# Patient Record
Sex: MALE | Race: WHITE | URBAN - METROPOLITAN AREA
[De-identification: names, ages, dates, MRNs, and addresses within clinical notes are randomized per-mention and may not be internally consistent; named-entity substitution may affect disease eponyms.]

---

## 2022-08-11 ENCOUNTER — TELEPHONE ENCOUNTER (OUTPATIENT)
Dept: URBAN - METROPOLITAN AREA CLINIC 92 | Facility: CLINIC | Age: 37
End: 2022-08-11

## 2022-08-11 ENCOUNTER — OFFICE VISIT (OUTPATIENT)
Dept: URBAN - METROPOLITAN AREA TELEHEALTH 2 | Facility: TELEHEALTH | Age: 37
End: 2022-08-11
Payer: COMMERCIAL

## 2022-08-11 VITALS — WEIGHT: 200 LBS | HEIGHT: 68 IN | BODY MASS INDEX: 30.31 KG/M2

## 2022-08-11 DIAGNOSIS — R10.84 GENERALIZED ABDOMINAL PAIN: ICD-10-CM

## 2022-08-11 DIAGNOSIS — R19.7 DIARRHEA, UNSPECIFIED TYPE: ICD-10-CM

## 2022-08-11 PROCEDURE — 99204 OFFICE O/P NEW MOD 45 MIN: CPT | Performed by: INTERNAL MEDICINE

## 2022-08-11 RX ORDER — LISINOPRIL 10 MG/1
1 TABLET TABLET ORAL ONCE A DAY
Status: ACTIVE | COMMUNITY

## 2022-08-11 RX ORDER — NORTRIPTYLINE HYDROCHLORIDE 25 MG/1
1 CAPSULE CAPSULE ORAL ONCE A DAY
Status: ACTIVE | COMMUNITY

## 2022-08-11 RX ORDER — CHOLESTYRAMINE 4 G/9G
1 PACKET MIXED WITH WATER OR NON-CARBONATED DRINK POWDER, FOR SUSPENSION ORAL TWICE A DAY
Qty: 60 | Refills: 2 | OUTPATIENT
Start: 2022-08-11

## 2022-08-11 RX ORDER — ESCITALOPRAM OXALATE 5 MG/1
1 TABLET TABLET, FILM COATED ORAL ONCE A DAY
Status: ACTIVE | COMMUNITY

## 2022-08-11 RX ORDER — VANCOMYCIN HYDROCHLORIDE 125 MG/1
1 CAPSULE CAPSULE ORAL ONCE A DAY
Status: ACTIVE | COMMUNITY

## 2022-08-11 NOTE — HPI-TODAY'S VISIT:
The patient is a new patient who elicits having had abdominal pain and diarrhea with no blood or mucous. Over 1 month ago, he traveled to [place] and developed abdominal pain and diarrhea. Immodium helped some. He saw his PCP about this and he was started on vancomycin which has helped him. His C diff test was negative. He was COVID negative with an at home test. Before he left to [place] he did an 11 day cleanse with meal replacement shakes. He also swam in a pool and in [facility] ocean in [place] and had seafood like octopus and lobster while he was there. He drank bottled water but brushed his teeth with the hotel water and drank drinks with ice. Last colonoscopy: never. He denies a family history of colon polyps or colon cancer or IBD. His abdominal pain has ceased now but he is having 2-3 loose BMs a day.

## 2022-08-22 LAB
A/G RATIO: 1.5
ALBUMIN: 4.6
ALKALINE PHOSPHATASE: 76
ALT (SGPT): 37
AST (SGOT): 22
BASO (ABSOLUTE): 0
BASOS: 0
BILIRUBIN, TOTAL: 0.4
BUN/CREATININE RATIO: 13
BUN: 14
C DIFFICILE TOXINS A+B, EIA: NEGATIVE
CALCIUM: 9.6
CAMPYLOBACTER CULTURE: (no result)
CARBON DIOXIDE, TOTAL: 24
CHLORIDE: 100
CREATININE: 1.04
DEAMIDATED GLIADIN ABS, IGA: 4
DEAMIDATED GLIADIN ABS, IGG: 4
E COLI SHIGA TOXIN EIA: NEGATIVE
EGFR: 94
ENDOMYSIAL ANTIBODY IGA: NEGATIVE
EOS (ABSOLUTE): 0.1
EOS: 2
FATS, NEUTRAL: NORMAL
FATS, TOTAL: NORMAL
GIARDIA LAMBLIA AG, EIA: NEGATIVE
GLOBULIN, TOTAL: 3
GLUCOSE: 83
HEMATOCRIT: 47.1
HEMATOLOGY COMMENTS:: (no result)
HEMOGLOBIN: 15.6
HEP A AB, IGM: NEGATIVE
HEP A AB, TOTAL: POSITIVE
IMMATURE CELLS: (no result)
IMMATURE GRANS (ABS): 0
IMMATURE GRANULOCYTES: 0
IMMUNOGLOBULIN A, QN, SERUM: 611
LIPASE: 33
LYMPHS (ABSOLUTE): 1.6
LYMPHS: 30
Lab: (no result)
MCH: 27.9
MCHC: 33.1
MCV: 84
MONOCYTES(ABSOLUTE): 0.5
MONOCYTES: 10
NEUTROPHILS (ABSOLUTE): 3
NEUTROPHILS: 58
NRBC: (no result)
OVA + PARASITE EXAM: (no result)
PANCREATIC ELASTASE, FECAL: 481
PLATELETS: 275
POTASSIUM: 4.7
PROTEIN, TOTAL: 7.6
RBC: 5.6
RDW: 12.6
SALMONELLA/SHIGELLA SCREEN: (no result)
SODIUM: 136
T-TRANSGLUTAMINASE (TTG) IGA: <2
T-TRANSGLUTAMINASE (TTG) IGG: <2
TSH: 1.95
WBC: 5.2
WHITE BLOOD CELLS (WBC), STOOL: (no result)

## 2022-10-03 ENCOUNTER — WEB ENCOUNTER (OUTPATIENT)
Dept: URBAN - METROPOLITAN AREA CLINIC 128 | Facility: CLINIC | Age: 37
End: 2022-10-03

## 2022-10-03 ENCOUNTER — OFFICE VISIT (OUTPATIENT)
Dept: URBAN - METROPOLITAN AREA CLINIC 128 | Facility: CLINIC | Age: 37
End: 2022-10-03
Payer: COMMERCIAL

## 2022-10-03 VITALS
BODY MASS INDEX: 31.37 KG/M2 | TEMPERATURE: 97.8 F | HEIGHT: 68 IN | HEART RATE: 100 BPM | SYSTOLIC BLOOD PRESSURE: 144 MMHG | DIASTOLIC BLOOD PRESSURE: 98 MMHG | WEIGHT: 207 LBS

## 2022-10-03 DIAGNOSIS — R19.7 DIARRHEA, UNSPECIFIED TYPE: ICD-10-CM

## 2022-10-03 DIAGNOSIS — R76.8 HEPATITIS A ANTIBODY POSITIVE: ICD-10-CM

## 2022-10-03 DIAGNOSIS — R10.84 GENERALIZED ABDOMINAL PAIN: ICD-10-CM

## 2022-10-03 PROCEDURE — 99213 OFFICE O/P EST LOW 20 MIN: CPT | Performed by: PHYSICIAN ASSISTANT

## 2022-10-03 RX ORDER — VANCOMYCIN HYDROCHLORIDE 125 MG/1
1 CAPSULE CAPSULE ORAL ONCE A DAY
Status: ON HOLD | COMMUNITY

## 2022-10-03 RX ORDER — ESCITALOPRAM OXALATE 5 MG/1
1 TABLET TABLET, FILM COATED ORAL ONCE A DAY
Status: ACTIVE | COMMUNITY

## 2022-10-03 RX ORDER — CHOLESTYRAMINE 4 G/9G
1 PACKET MIXED WITH WATER OR NON-CARBONATED DRINK POWDER, FOR SUSPENSION ORAL TWICE A DAY
Qty: 60 | Refills: 2 | Status: ON HOLD | COMMUNITY
Start: 2022-08-11

## 2022-10-03 RX ORDER — NORTRIPTYLINE HYDROCHLORIDE 25 MG/1
1 CAPSULE CAPSULE ORAL ONCE A DAY
Status: ACTIVE | COMMUNITY

## 2022-10-03 RX ORDER — LISINOPRIL 10 MG/1
1 TABLET TABLET ORAL ONCE A DAY
Status: ACTIVE | COMMUNITY

## 2022-10-03 NOTE — HPI-TODAY'S VISIT:
The patient is here to follow up on his abdominal pain and diarrhea which have both ceased since his last office visit. He denies rectal bkeeding or mucous. He took the questran but has since stopped it. Over 1 month ago, he traveled to [place] and developed abdominal pain and diarrhea. Immodium helped some. He saw his PCP about this and he was started on vancomycin which has helped him. His C diff test was negative. He was COVID negative with an at home test. Before he left to [place] he did an 11 day cleanse with meal replacement shakes. He also swam in a pool and in [facility] ocean in [place] and had seafood like octopus and lobster while he was there. He drank bottled water but brushed his teeth with the hotel water and drank drinks with ice. Last colonoscopy: never. He denies a family history of colon polyps or colon cancer or IBD. His Hepatitis A total antibody was positibe buthis Hep A IgM level was normal. He has never received the hepatitis A or B vaccines. The rest of his bloodwork and stool studies were normal.

## 2023-11-09 ENCOUNTER — OFFICE VISIT (OUTPATIENT)
Dept: URBAN - METROPOLITAN AREA CLINIC 19 | Facility: CLINIC | Age: 38
End: 2023-11-09
Payer: COMMERCIAL

## 2023-11-09 ENCOUNTER — LAB OUTSIDE AN ENCOUNTER (OUTPATIENT)
Dept: URBAN - METROPOLITAN AREA CLINIC 19 | Facility: CLINIC | Age: 38
End: 2023-11-09

## 2023-11-09 VITALS
DIASTOLIC BLOOD PRESSURE: 80 MMHG | HEIGHT: 68 IN | OXYGEN SATURATION: 98 % | TEMPERATURE: 97.6 F | WEIGHT: 211.2 LBS | SYSTOLIC BLOOD PRESSURE: 132 MMHG | HEART RATE: 93 BPM | BODY MASS INDEX: 32.01 KG/M2

## 2023-11-09 DIAGNOSIS — Z83.719 FAMILY HISTORY OF COLONIC POLYPS: ICD-10-CM

## 2023-11-09 DIAGNOSIS — K62.5 RECTAL BLEEDING: ICD-10-CM

## 2023-11-09 PROCEDURE — 99203 OFFICE O/P NEW LOW 30 MIN: CPT | Performed by: NURSE PRACTITIONER

## 2023-11-09 PROCEDURE — 99213 OFFICE O/P EST LOW 20 MIN: CPT | Performed by: NURSE PRACTITIONER

## 2023-11-09 RX ORDER — LISINOPRIL 10 MG/1
1 TABLET TABLET ORAL ONCE A DAY
Status: ACTIVE | COMMUNITY

## 2023-11-09 RX ORDER — ESCITALOPRAM OXALATE 5 MG/1
1 TABLET TABLET, FILM COATED ORAL ONCE A DAY
Status: ACTIVE | COMMUNITY

## 2023-11-09 RX ORDER — NORTRIPTYLINE HYDROCHLORIDE 25 MG/1
1 CAPSULE CAPSULE ORAL ONCE A DAY
Status: ACTIVE | COMMUNITY

## 2023-11-09 NOTE — HPI-TODAY'S VISIT:
Mr. Orta is a 39 yo male with PMH of HTN, depression, juvenile RA, gout who presents today for concern of rectal bleeding. His brother who is a patient here referrered him.  He reports rectal bleeding ongoing over the past year. Occasionally seen in toilet. Occurs a few times a week. No abdominal or rectal pain.  Typically has a soft BM daily or twice a day. Takes magnesium as a muscle relaxer which helps.   Brother had colon polyps. No known family history of colon cancer.  He is not on any blood thinners. Takes Ibuprofen about once/twice a week.  No significant cardio/pulm/kidney disease.

## 2023-11-17 ENCOUNTER — TELEPHONE ENCOUNTER (OUTPATIENT)
Dept: URBAN - METROPOLITAN AREA CLINIC 19 | Facility: CLINIC | Age: 38
End: 2023-11-17

## 2023-11-17 ENCOUNTER — LAB OUTSIDE AN ENCOUNTER (OUTPATIENT)
Dept: URBAN - METROPOLITAN AREA CLINIC 19 | Facility: CLINIC | Age: 38
End: 2023-11-17

## 2023-12-04 ENCOUNTER — TELEPHONE ENCOUNTER (OUTPATIENT)
Dept: URBAN - METROPOLITAN AREA CLINIC 19 | Facility: CLINIC | Age: 38
End: 2023-12-04

## 2023-12-29 ENCOUNTER — OFFICE VISIT (OUTPATIENT)
Dept: URBAN - METROPOLITAN AREA SURGERY CENTER 31 | Facility: SURGERY CENTER | Age: 38
End: 2023-12-29
Payer: COMMERCIAL

## 2023-12-29 ENCOUNTER — CLAIMS CREATED FROM THE CLAIM WINDOW (OUTPATIENT)
Dept: URBAN - METROPOLITAN AREA SURGERY CENTER 31 | Facility: SURGERY CENTER | Age: 38
End: 2023-12-29

## 2023-12-29 DIAGNOSIS — Z12.11 COLON CANCER SCREENING: ICD-10-CM

## 2023-12-29 DIAGNOSIS — Z83.719 FAMILY HISTORY OF COLONIC POLYPS: ICD-10-CM

## 2023-12-29 DIAGNOSIS — K64.8 HEMORRHOIDS, INTERNAL: ICD-10-CM

## 2023-12-29 DIAGNOSIS — Z83.718 FAMILY HISTORY OF FAMILIAL POLYPOSIS: ICD-10-CM

## 2023-12-29 DIAGNOSIS — Z12.11 COLON CANCER SCREENING (HIGH RISK): ICD-10-CM

## 2023-12-29 PROCEDURE — G8907 PT DOC NO EVENTS ON DISCHARG: HCPCS | Performed by: INTERNAL MEDICINE

## 2023-12-29 PROCEDURE — G0105 COLORECTAL SCRN; HI RISK IND: HCPCS | Performed by: INTERNAL MEDICINE

## 2023-12-29 PROCEDURE — 00811 ANES LWR INTST NDSC NOS: CPT | Performed by: NURSE ANESTHETIST, CERTIFIED REGISTERED

## 2024-01-02 ENCOUNTER — TELEPHONE ENCOUNTER (OUTPATIENT)
Dept: URBAN - METROPOLITAN AREA CLINIC 19 | Facility: CLINIC | Age: 39
End: 2024-01-02

## 2024-01-29 ENCOUNTER — OFFICE VISIT (OUTPATIENT)
Dept: URBAN - METROPOLITAN AREA CLINIC 19 | Facility: CLINIC | Age: 39
End: 2024-01-29
Payer: COMMERCIAL

## 2024-01-29 VITALS — HEIGHT: 68 IN

## 2024-01-29 DIAGNOSIS — K64.1 GRADE II HEMORRHOIDS: ICD-10-CM

## 2024-01-29 PROCEDURE — 46221 LIGATION OF HEMORRHOID(S): CPT | Performed by: INTERNAL MEDICINE

## 2024-01-29 RX ORDER — LISINOPRIL 10 MG/1
1 TABLET TABLET ORAL ONCE A DAY
COMMUNITY

## 2024-01-29 RX ORDER — ESCITALOPRAM OXALATE 5 MG/1
1 TABLET TABLET, FILM COATED ORAL ONCE A DAY
COMMUNITY

## 2024-01-29 RX ORDER — NORTRIPTYLINE HYDROCHLORIDE 25 MG/1
1 CAPSULE CAPSULE ORAL ONCE A DAY
COMMUNITY

## 2024-01-29 NOTE — HPI-TODAY'S VISIT:
Mr. Orta is a 39 yo male with PMH of HTN, depression, juvenile RA, gout who presents today for concern of rectal bleeding. His brother who is a patient here referrered him.  He reports rectal bleeding ongoing over the past year. Occasionally seen in toilet. Occurs a few times a week. No abdominal or rectal pain.  Typically has a soft BM daily or twice a day. Takes magnesium as a muscle relaxer which helps.   Brother had colon polyps. No known family history of colon cancer.  He is not on any blood thinners. Takes Ibuprofen about once/twice a week.  No significant cardio/pulm/kidney disease.  1/29/24   Here for hemorrhoid banding. Colonoscopy Dec 2023- moderate internal hemorrhoids noted. Drinking more water now, not constipated. Here for hemorrhoid banding.

## 2024-02-20 ENCOUNTER — HEM (OUTPATIENT)
Dept: URBAN - METROPOLITAN AREA CLINIC 19 | Facility: CLINIC | Age: 39
End: 2024-02-20
Payer: COMMERCIAL

## 2024-02-20 VITALS
DIASTOLIC BLOOD PRESSURE: 79 MMHG | WEIGHT: 214.2 LBS | BODY MASS INDEX: 32.46 KG/M2 | HEART RATE: 92 BPM | SYSTOLIC BLOOD PRESSURE: 126 MMHG | HEIGHT: 68 IN | TEMPERATURE: 97.6 F

## 2024-02-20 DIAGNOSIS — K64.1 GRADE II HEMORRHOIDS: ICD-10-CM

## 2024-02-20 DIAGNOSIS — K62.5 RECTAL BLEEDING: ICD-10-CM

## 2024-02-20 PROCEDURE — 99214 OFFICE O/P EST MOD 30 MIN: CPT | Performed by: INTERNAL MEDICINE

## 2024-02-20 PROCEDURE — 46221 LIGATION OF HEMORRHOID(S): CPT | Performed by: INTERNAL MEDICINE

## 2024-02-20 RX ORDER — ESCITALOPRAM OXALATE 5 MG/1
1 TABLET TABLET, FILM COATED ORAL ONCE A DAY
COMMUNITY

## 2024-02-20 RX ORDER — LISINOPRIL 10 MG/1
1 TABLET TABLET ORAL ONCE A DAY
COMMUNITY

## 2024-02-20 RX ORDER — NORTRIPTYLINE HYDROCHLORIDE 25 MG/1
1 CAPSULE CAPSULE ORAL ONCE A DAY
COMMUNITY

## 2024-02-20 NOTE — HPI-TODAY'S VISIT:
Mr. Orta is a 37 yo male with PMH of HTN, depression, juvenile RA, gout who presents today for concern of rectal bleeding. His brother who is a patient here referrered him.  He reports rectal bleeding ongoing over the past year. Occasionally seen in toilet. Occurs a few times a week. No abdominal or rectal pain.  Typically has a soft BM daily or twice a day. Takes magnesium as a muscle relaxer which helps.   Brother had colon polyps. No known family history of colon cancer.  He is not on any blood thinners. Takes Ibuprofen about once/twice a week.  No significant cardio/pulm/kidney disease.  1/29/24   Here for hemorrhoid banding. Colonoscopy Dec 2023- moderate internal hemorrhoids noted. Drinking more water now, not constipated. Here for hemorrhoid banding.  2/20/24  He had right anterior hemorrhoid banding 3 weeks ago- no complications. Doing well. Small amount of spotting but fine otherwise.

## 2024-03-11 ENCOUNTER — HEM (OUTPATIENT)
Dept: URBAN - METROPOLITAN AREA CLINIC 19 | Facility: CLINIC | Age: 39
End: 2024-03-11
Payer: COMMERCIAL

## 2024-03-11 VITALS — HEIGHT: 68 IN

## 2024-03-11 DIAGNOSIS — K64.1 GRADE II HEMORRHOIDS: ICD-10-CM

## 2024-03-11 DIAGNOSIS — K62.5 RECTAL BLEEDING: ICD-10-CM

## 2024-03-11 PROCEDURE — 99212 OFFICE O/P EST SF 10 MIN: CPT | Performed by: INTERNAL MEDICINE

## 2024-03-11 PROCEDURE — 46221 LIGATION OF HEMORRHOID(S): CPT | Performed by: INTERNAL MEDICINE

## 2024-03-11 RX ORDER — NORTRIPTYLINE HYDROCHLORIDE 25 MG/1
1 CAPSULE CAPSULE ORAL ONCE A DAY
COMMUNITY

## 2024-03-11 RX ORDER — LISINOPRIL 10 MG/1
1 TABLET TABLET ORAL ONCE A DAY
COMMUNITY

## 2024-03-11 RX ORDER — ESCITALOPRAM OXALATE 5 MG/1
1 TABLET TABLET, FILM COATED ORAL ONCE A DAY
COMMUNITY

## 2024-03-11 NOTE — HPI-TODAY'S VISIT:
Mr. Orta is a 39 yo male with PMH of HTN, depression, juvenile RA, gout who presents today for concern of rectal bleeding. His brother who is a patient here referrered him.  He reports rectal bleeding ongoing over the past year. Occasionally seen in toilet. Occurs a few times a week. No abdominal or rectal pain.  Typically has a soft BM daily or twice a day. Takes magnesium as a muscle relaxer which helps.   Brother had colon polyps. No known family history of colon cancer.  He is not on any blood thinners. Takes Ibuprofen about once/twice a week.  No significant cardio/pulm/kidney disease.  1/29/24   Here for hemorrhoid banding. Colonoscopy Dec 2023- moderate internal hemorrhoids noted. Drinking more water now, not constipated. Here for hemorrhoid banding.  2/20/24  He had right anterior hemorrhoid banding 3 weeks ago- no complications. Doing well. Small amount of spotting but fine otherwise.  3/11/2024 Here for 3 rd session hemorrhoid banding. No more bleeding. Stools soft with high fiber diet and increased water intake.